# Patient Record
Sex: FEMALE | Race: WHITE | NOT HISPANIC OR LATINO | Employment: FULL TIME | URBAN - METROPOLITAN AREA
[De-identification: names, ages, dates, MRNs, and addresses within clinical notes are randomized per-mention and may not be internally consistent; named-entity substitution may affect disease eponyms.]

---

## 2023-05-19 ENCOUNTER — TELEPHONE (OUTPATIENT)
Dept: OBSTETRICS AND GYNECOLOGY | Facility: CLINIC | Age: 33
End: 2023-05-19
Payer: COMMERCIAL

## 2023-05-19 NOTE — TELEPHONE ENCOUNTER
1st attempt- left voicemail.   Patient being referred by ROSAURA Babcock at [facility] for her yearly exam. Patient would be a new patient to our office so she may be scheduled first available appointment with any provider in office, except , but including all three of our APRN's.  HUB TO READ.